# Patient Record
Sex: FEMALE | Race: WHITE | Employment: STUDENT | URBAN - NONMETROPOLITAN AREA
[De-identification: names, ages, dates, MRNs, and addresses within clinical notes are randomized per-mention and may not be internally consistent; named-entity substitution may affect disease eponyms.]

---
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
## 2022-10-04 ENCOUNTER — HOSPITAL ENCOUNTER (EMERGENCY)
Age: 22
Discharge: HOME | End: 2022-10-04
Attending: NURSE PRACTITIONER
Payer: COMMERCIAL

## 2022-10-04 VITALS
TEMPERATURE: 97.5 F | OXYGEN SATURATION: 100 % | SYSTOLIC BLOOD PRESSURE: 125 MMHG | HEART RATE: 108 BPM | RESPIRATION RATE: 18 BRPM | DIASTOLIC BLOOD PRESSURE: 66 MMHG

## 2022-10-04 DIAGNOSIS — R23.3 PETECHIAL RASH: Primary | ICD-10-CM

## 2022-10-04 LAB
BASOPHILS # BLD: 0.6 %
BASOPHILS ABSOLUTE: 0.1 THOU/MM3 (ref 0–0.1)
BUN WHOLE BLOOD, UC: 10 MG/DL (ref 8–26)
CHLORIDE, WHOLE BLOOD: 104 MEQ/L (ref 98–109)
CO2, WHOLE BLOOD: 25 MEQ/L (ref 23–33)
CREATININE WHOLE BLOOD, UC: 0.8 MG/DL (ref 0.5–1.2)
EOSINOPHIL # BLD: 0.4 %
EOSINOPHILS ABSOLUTE: 0 THOU/MM3 (ref 0–0.4)
GFR, ESTIMATED: > 90 ML/MIN/1.73M2
GLUCOSE, WHOLE BLOOD: 92 MG/DL (ref 70–108)
HCT VFR BLD CALC: 40.5 % (ref 37–47)
HEMOGLOBIN: 13.9 GM/DL (ref 12–16)
IMMATURE GRANS (ABS): 0.06 THOU/MM3 (ref 0–0.07)
IMMATURE GRANULOCYTES: 0.7 %
LYMPHOCYTES # BLD: 20.4 %
LYMPHOCYTES ABSOLUTE: 1.8 THOU/MM3 (ref 1–4.8)
MCH RBC QN AUTO: 28.8 PG (ref 27–31)
MCHC RBC AUTO-ENTMCNC: 34.3 GM/DL (ref 33–37)
MCV RBC AUTO: 84 FL (ref 81–99)
MONOCYTES # BLD: 5.7 %
MONOCYTES ABSOLUTE: 0.5 THOU/MM3 (ref 0.4–1.3)
NUCLEATED RED BLOOD CELLS: 0 /100 WBC
PDW BLD-RTO: 13.9 % (ref 11.5–14.5)
PLATELET # BLD: 251 THOU/MM3 (ref 130–400)
PMV BLD AUTO: 11.5 FL (ref 7.4–10.4)
POTASSIUM, WHOLE BLOOD: 4 MEQ/L (ref 3.5–4.9)
RBC # BLD: 4.83 MILL/MM3 (ref 4.2–5.4)
SEG NEUTROPHILS: 72.2 %
SEGMENTED NEUTROPHILS ABSOLUTE COUNT: 6.3 THOU/MM3 (ref 1.8–7.7)
SODIUM, WHOLE BLOOD: 139 MEQ/L (ref 138–146)
WBC # BLD: 8.7 THOU/MM3 (ref 4.8–10.8)

## 2022-10-04 PROCEDURE — 99213 OFFICE O/P EST LOW 20 MIN: CPT | Performed by: NURSE PRACTITIONER

## 2022-10-04 PROCEDURE — 99213 OFFICE O/P EST LOW 20 MIN: CPT

## 2022-10-04 PROCEDURE — 80051 ELECTROLYTE PANEL: CPT

## 2022-10-04 PROCEDURE — 36415 COLL VENOUS BLD VENIPUNCTURE: CPT

## 2022-10-04 PROCEDURE — 84520 ASSAY OF UREA NITROGEN: CPT

## 2022-10-04 PROCEDURE — 82947 ASSAY GLUCOSE BLOOD QUANT: CPT

## 2022-10-04 PROCEDURE — 82565 ASSAY OF CREATININE: CPT

## 2022-10-04 PROCEDURE — 85025 COMPLETE CBC W/AUTO DIFF WBC: CPT

## 2022-10-04 ASSESSMENT — ENCOUNTER SYMPTOMS
RHINORRHEA: 0
SINUS PRESSURE: 0
SINUS PAIN: 0
EYES NEGATIVE: 1
VOMITING: 0
CONSTIPATION: 0
NAUSEA: 0
SORE THROAT: 0
DIARRHEA: 0
ABDOMINAL PAIN: 0
SHORTNESS OF BREATH: 0
WHEEZING: 0

## 2022-10-04 NOTE — ED PROVIDER NOTES
[address]       Chief Complaint   Patient presents with    Urticaria     Started today        Nurses Notes reviewed and I agree except as noted in the HPI. HISTORY OF PRESENT ILLNESS   Kuldip Bustos is a 23 y.o. female who presents to urgent care today describing a rash that she just noted this morning. She denies itching. She denies fever, fatigue, vomiting. Normally healthy. REVIEW OF SYSTEMS     Review of Systems   Constitutional:  Negative for chills, fatigue, fever and unexpected weight change. HENT:  Negative for congestion, postnasal drip, rhinorrhea, sinus pressure, sinus pain, sneezing and sore throat. Eyes: Negative. Respiratory:  Negative for shortness of breath and wheezing. Cardiovascular:  Negative for chest pain. Gastrointestinal:  Negative for abdominal pain, constipation, diarrhea, nausea and vomiting. Endocrine: Negative. Genitourinary:  Negative for difficulty urinating, dyspareunia, dysuria, hematuria, urgency, vaginal bleeding, vaginal discharge and vaginal pain. Musculoskeletal:  Negative for myalgias. Skin:  Positive for rash. Neurological:  Negative for dizziness, light-headedness and headaches. Hematological:  Negative for adenopathy. Psychiatric/Behavioral:  Negative for agitation. PAST MEDICAL HISTORY         Diagnosis Date    Acid reflux     Anxiety        SURGICAL HISTORY     Patient  has a past surgical history that includes shoulder surgery. CURRENT MEDICATIONS       Discharge Medication List as of 10/4/2022  2:24 PM        CONTINUE these medications which have NOT CHANGED    Details   !! UNKNOWN TO PATIENT Indications: acid reflux Historical Med      !! UNKNOWN TO PATIENT Indications: anxiety Historical Med      Clotrimazole 1 % OINT Small amount to left lower leg and hand twice daily for up to 2 weeks. , Disp-15 g, R-0Normal      betamethasone valerate (VALISONE) 0.1 % cream Apply topically 2 times daily to right leg and arm until resolved, Disp-15 g, R-0, Normal       !! - Potential duplicate medications found. Please discuss with provider. ALLERGIES     Patient is has No Known Allergies. FAMILY HISTORY     Patient'sfamily history is not on file. SOCIAL HISTORY     Patient  reports that she has never smoked. She has never used smokeless tobacco. She reports that she does not drink alcohol and does not use drugs. PHYSICAL EXAM     ED TRIAGE VITALS  BP: 125/66, Temp: 97.5 °F (36.4 °C), Heart Rate: (!) 108, Resp: 18, SpO2: 100 %  Physical Exam  Vitals and nursing note reviewed. Constitutional:       General: She is not in acute distress. Appearance: Normal appearance. She is not ill-appearing or toxic-appearing. HENT:      Head: Normocephalic and atraumatic. Nose: No congestion or rhinorrhea. Mouth/Throat:      Mouth: Mucous membranes are moist.      Pharynx: Oropharynx is clear. Eyes:      Extraocular Movements: Extraocular movements intact. Conjunctiva/sclera: Conjunctivae normal.      Pupils: Pupils are equal, round, and reactive to light. Cardiovascular:      Rate and Rhythm: Normal rate and regular rhythm. Pulses: Normal pulses. Heart sounds: Normal heart sounds. No murmur heard. Pulmonary:      Effort: Pulmonary effort is normal. No respiratory distress. Breath sounds: Normal breath sounds. No wheezing. Abdominal:      General: Abdomen is flat. Palpations: Abdomen is soft. Tenderness: There is no abdominal tenderness. Musculoskeletal:         General: No swelling or deformity. Normal range of motion. Cervical back: Normal range of motion and neck supple. Skin:     General: Skin is warm and dry. Capillary Refill: Capillary refill takes less than 2 seconds. Findings: No rash. Comments: Patient has petechial rash present nonblanchable and nonpalpable, pinpoint macules.   Present only to the collarbone area and below bilateral eyes. No itching. Skin is warm dry to touch. Cap refill less than 2 seconds. Neurological:      General: No focal deficit present. Mental Status: She is alert and oriented to person, place, and time. Mental status is at baseline. Psychiatric:         Mood and Affect: Mood normal.         Behavior: Behavior normal.         Thought Content: Thought content normal.         Judgment: Judgment normal.       DIAGNOSTIC RESULTS   Labs:  Results for orders placed or performed during the hospital encounter of 10/04/22   CBC with Auto Differential   Result Value Ref Range    WBC 8.7 4.8 - 10.8 thou/mm3    RBC 4.83 4.20 - 5.40 mill/mm3    Hemoglobin 13.9 12.0 - 16.0 gm/dl    Hematocrit 40.5 37.0 - 47.0 %    MCV 84 81 - 99 fL    MCH 28.8 27.0 - 31.0 pg    MCHC 34.3 33.0 - 37.0 gm/dl    RDW 13.9 11.5 - 14.5 %    Platelets 431 100 - 423 thou/mm3    MPV 11.5 (H) 7.4 - 10.4 fL   POC Basic Metabol Panel without Calcium   Result Value Ref Range    Sodium, Whole Blood 139 138 - 146 meq/l    Potassium, Whole Blood 4.0 3.5 - 4.9 meq/l    Chloride, Whole Blood 104 98 - 109 meq/l    CO2, WHOLE BLOOD 25 23 - 33 meq/l    Glucose, Whole Blood 92 70 - 108 mg/dl    BUN WHOLE BLOOD, UC 10 8 - 26 mg/dl    CREATININE WHOLE BLOOD, UC 0.8 0.5 - 1.2 mg/dl   Glomerular Filtration Rate, Estimated   Result Value Ref Range    GFR, Estimated > 90 ml/min/1.73m2       IMAGING:  No orders to display     URGENT CARE COURSE:         Medications - No data to display  PROCEDURES:  FINALIMPRESSION      1. Petechial rash        DISPOSITION/PLAN   DISPOSITION Decision To Discharge 10/04/2022 02:22:53 PM    Patient is a non-ill-appearing 70-year-old female who just noticed a rash to her chest and face this morning. She is afebrile. Nontoxic-appearing. Patient has a faint nonblanchable pinpoint petechial rash present to the chest and face.   She has recently had upper respiratory infection as early as last week. White blood cell count is 8.7. Hemoglobin is 13.9. Hematocrit is 40.5. Platelets are 683. GFR is greater than 90. Recommend that patient follow-up with primary care provider. Report to ER with any new or worsening symptoms. She denies any questions.     PATIENT REFERRED TO:  Viry Corbin MD  [address] [place] [address] [phone]    In 3 days    DISCHARGE MEDICATIONS:  Discharge Medication List as of 10/4/2022  2:24 PM        Discharge Medication List as of 10/4/2022  2:24 PM          Raciel Kellogg APRKENNEDY - MELINA Elias - LEAH  10/04/22 155

## 2022-10-04 NOTE — Clinical Note
Nandini Slaughter was seen and treated in our emergency department on 10/4/2022. She may return to work on 10/05/2022. If you have any questions or concerns, please don't hesitate to call.       Yumiko Singh, MELINA - CNP

## 2022-10-04 NOTE — ED TRIAGE NOTES
Pt to UC with hives that started today. Pt denies any new lotions, creams or any changes to lifestyle.

## 2023-06-03 ENCOUNTER — HOSPITAL ENCOUNTER (EMERGENCY)
Age: 23
Discharge: HOME | End: 2023-06-03
Payer: COMMERCIAL

## 2023-06-03 VITALS
TEMPERATURE: 98.9 F | SYSTOLIC BLOOD PRESSURE: 119 MMHG | HEART RATE: 80 BPM | RESPIRATION RATE: 16 BRPM | DIASTOLIC BLOOD PRESSURE: 60 MMHG | OXYGEN SATURATION: 98 % | HEIGHT: 65 IN | WEIGHT: 121 LBS | BODY MASS INDEX: 20.16 KG/M2

## 2023-06-03 DIAGNOSIS — J06.9 UPPER RESPIRATORY INFECTION WITH COUGH AND CONGESTION: Primary | ICD-10-CM

## 2023-06-03 DIAGNOSIS — Z02.89 ENCOUNTER TO OBTAIN EXCUSE FROM WORK: ICD-10-CM

## 2023-06-03 PROCEDURE — 99213 OFFICE O/P EST LOW 20 MIN: CPT | Performed by: NURSE PRACTITIONER

## 2023-06-03 RX ORDER — PSEUDOEPHEDRINE HCL 30 MG
30 TABLET ORAL EVERY 4 HOURS PRN
Refills: 1 | COMMUNITY
Start: 2023-06-03 | End: 2023-06-10

## 2023-06-03 ASSESSMENT — ENCOUNTER SYMPTOMS
COUGH: 1
NAUSEA: 0
SHORTNESS OF BREATH: 0
VOMITING: 1
SORE THROAT: 0

## 2023-06-03 ASSESSMENT — PAIN - FUNCTIONAL ASSESSMENT: PAIN_FUNCTIONAL_ASSESSMENT: NONE - DENIES PAIN

## 2023-06-03 NOTE — ED PROVIDER NOTES
[facility]       CHIEF COMPLAINT       Chief Complaint   Patient presents with    Cough    URI       Nurses Notes reviewed and I agree except as noted in the HPI. HISTORY OF PRESENT ILLNESS   Jeannine Barajas is a 26 y.o. female who presents with new onset cough and congestion. Her symptoms started last evening. She does admit to coughing and vomiting once. She believes it may be related to her drainage. Denies any fever, shortness of breath, or headaches. No treatment has been tried. Is requesting a work note for [employer]. The history is provided by the patient. REVIEW OF SYSTEMS     Review of Systems   Constitutional:  Negative for fatigue and fever. HENT:  Positive for congestion. Negative for sore throat. Respiratory:  Positive for cough. Negative for shortness of breath. Cardiovascular:  Negative for chest pain. Gastrointestinal:  Positive for vomiting. Negative for nausea. Musculoskeletal:  Negative for myalgias. Neurological:  Negative for headaches. PAST MEDICAL HISTORY         Diagnosis Date    Acid reflux     Anxiety        SURGICALHISTORY     Patient  has a past surgical history that includes shoulder surgery. CURRENT MEDICATIONS       Previous Medications    No medications on file       ALLERGIES     Patient is has No Known Allergies. Patients   There is no immunization history on file for this patient. FAMILY HISTORY     Patient's family history is not on file. SOCIAL HISTORY     Patient  reports that she has never smoked. She has never been exposed to tobacco smoke. She has never used smokeless tobacco. She reports that she does not drink alcohol and does not use drugs. PHYSICAL EXAM     ED TRIAGE VITALS  BP: 119/60, Temp: 98.9 °F (37.2 °C), Pulse: 80, Respirations: 16, SpO2: 98 %,Estimated body mass index is 20.14 kg/m² as calculated from the following:    Height as of this encounter: 5' 5\" (1.651 m).